# Patient Record
Sex: MALE | Race: WHITE | Employment: FULL TIME | URBAN - METROPOLITAN AREA
[De-identification: names, ages, dates, MRNs, and addresses within clinical notes are randomized per-mention and may not be internally consistent; named-entity substitution may affect disease eponyms.]

---

## 2018-11-20 ENCOUNTER — TELEPHONE (OUTPATIENT)
Dept: VASCULAR SURGERY | Age: 58
End: 2018-11-20

## 2019-02-04 ENCOUNTER — HOSPITAL ENCOUNTER (OUTPATIENT)
Dept: CARDIOLOGY | Age: 59
Discharge: HOME | End: 2019-02-04
Payer: COMMERCIAL

## 2019-02-04 ENCOUNTER — OFFICE VISIT (OUTPATIENT)
Dept: VASCULAR SURGERY | Age: 59
End: 2019-02-04
Payer: COMMERCIAL

## 2019-02-04 DIAGNOSIS — I65.23 ASYMPTOMATIC BILATERAL CAROTID ARTERY STENOSIS: Primary | Chronic | ICD-10-CM

## 2019-02-04 DIAGNOSIS — I65.23 CAROTID ARTERY STENOSIS, ASYMPTOMATIC, BILATERAL: ICD-10-CM

## 2019-02-04 PROCEDURE — G8598 ASA/ANTIPLAT THER USED: HCPCS | Performed by: NURSE PRACTITIONER

## 2019-02-04 PROCEDURE — 4004F PT TOBACCO SCREEN RCVD TLK: CPT | Performed by: NURSE PRACTITIONER

## 2019-02-04 PROCEDURE — G8421 BMI NOT CALCULATED: HCPCS | Performed by: NURSE PRACTITIONER

## 2019-02-04 PROCEDURE — 99213 OFFICE O/P EST LOW 20 MIN: CPT | Performed by: NURSE PRACTITIONER

## 2019-02-04 PROCEDURE — 93880 EXTRACRANIAL BILAT STUDY: CPT

## 2019-02-04 PROCEDURE — G8427 DOCREV CUR MEDS BY ELIG CLIN: HCPCS | Performed by: NURSE PRACTITIONER

## 2019-02-04 PROCEDURE — G8484 FLU IMMUNIZE NO ADMIN: HCPCS | Performed by: NURSE PRACTITIONER

## 2019-02-04 PROCEDURE — 3017F COLORECTAL CA SCREEN DOC REV: CPT | Performed by: NURSE PRACTITIONER

## 2019-08-05 ENCOUNTER — HOSPITAL ENCOUNTER (OUTPATIENT)
Age: 59
Discharge: HOME | End: 2019-08-07

## 2019-08-05 PROCEDURE — 88342 IMHCHEM/IMCYTCHM 1ST ANTB: CPT

## 2019-08-05 PROCEDURE — 88305 TISSUE EXAM BY PATHOLOGIST: CPT

## 2020-03-02 ENCOUNTER — HOSPITAL ENCOUNTER (OUTPATIENT)
Dept: CARDIOLOGY | Age: 60
Discharge: HOME | End: 2020-03-02
Payer: COMMERCIAL

## 2020-03-02 ENCOUNTER — OFFICE VISIT (OUTPATIENT)
Dept: VASCULAR SURGERY | Age: 60
End: 2020-03-02
Payer: COMMERCIAL

## 2020-03-02 VITALS
WEIGHT: 175 LBS | HEART RATE: 76 BPM | SYSTOLIC BLOOD PRESSURE: 110 MMHG | HEIGHT: 68 IN | DIASTOLIC BLOOD PRESSURE: 60 MMHG | BODY MASS INDEX: 26.52 KG/M2 | RESPIRATION RATE: 16 BRPM

## 2020-03-02 PROCEDURE — 3017F COLORECTAL CA SCREEN DOC REV: CPT | Performed by: SURGERY

## 2020-03-02 PROCEDURE — 93880 EXTRACRANIAL BILAT STUDY: CPT

## 2020-03-02 PROCEDURE — 99213 OFFICE O/P EST LOW 20 MIN: CPT | Performed by: PHYSICIAN ASSISTANT

## 2020-03-02 PROCEDURE — G8427 DOCREV CUR MEDS BY ELIG CLIN: HCPCS | Performed by: SURGERY

## 2020-03-02 PROCEDURE — G8419 CALC BMI OUT NRM PARAM NOF/U: HCPCS | Performed by: SURGERY

## 2020-03-02 PROCEDURE — 4004F PT TOBACCO SCREEN RCVD TLK: CPT | Performed by: SURGERY

## 2020-03-02 PROCEDURE — G8484 FLU IMMUNIZE NO ADMIN: HCPCS | Performed by: SURGERY

## 2021-03-01 ENCOUNTER — OFFICE VISIT (OUTPATIENT)
Dept: VASCULAR SURGERY | Age: 61
End: 2021-03-01
Payer: COMMERCIAL

## 2021-03-01 ENCOUNTER — HOSPITAL ENCOUNTER (OUTPATIENT)
Dept: CARDIOLOGY | Age: 61
Discharge: HOME | End: 2021-03-01
Payer: COMMERCIAL

## 2021-03-01 VITALS — WEIGHT: 170 LBS | HEIGHT: 68 IN | BODY MASS INDEX: 25.76 KG/M2

## 2021-03-01 DIAGNOSIS — I65.23 CAROTID ARTERY STENOSIS, ASYMPTOMATIC, BILATERAL: Primary | ICD-10-CM

## 2021-03-01 DIAGNOSIS — I65.23 ASYMPTOMATIC BILATERAL CAROTID ARTERY STENOSIS: ICD-10-CM

## 2021-03-01 PROCEDURE — 4004F PT TOBACCO SCREEN RCVD TLK: CPT | Performed by: NURSE PRACTITIONER

## 2021-03-01 PROCEDURE — 99212 OFFICE O/P EST SF 10 MIN: CPT | Performed by: NURSE PRACTITIONER

## 2021-03-01 PROCEDURE — G8427 DOCREV CUR MEDS BY ELIG CLIN: HCPCS | Performed by: NURSE PRACTITIONER

## 2021-03-01 PROCEDURE — G8484 FLU IMMUNIZE NO ADMIN: HCPCS | Performed by: NURSE PRACTITIONER

## 2021-03-01 PROCEDURE — 93880 EXTRACRANIAL BILAT STUDY: CPT

## 2021-03-01 PROCEDURE — G8419 CALC BMI OUT NRM PARAM NOF/U: HCPCS | Performed by: NURSE PRACTITIONER

## 2021-03-01 PROCEDURE — 3017F COLORECTAL CA SCREEN DOC REV: CPT | Performed by: NURSE PRACTITIONER

## 2021-03-01 RX ORDER — NICOTINE 21 MG/24HR
1 PATCH, TRANSDERMAL 24 HOURS TRANSDERMAL EVERY 24 HOURS
Qty: 30 PATCH | Refills: 3 | Status: SHIPPED
Start: 2021-03-01 | End: 2022-10-11

## 2021-03-01 NOTE — PROGRESS NOTES
Vascular Surgery Outpatient Followup    PCP : Lisy Parekh MD    HISTORY OF PRESENT ILLNESS:    The patient is a 64 y.o. male who is here in regards to follow up of their Asymptomatic Bilateral carotid stenosis. Patient denies hx of stroke, TIA, focal weakness, slurred speech or amaurosis fugax. Patient denies any changes in overall health. He continues to try and quit smoking cigarettes. He states that he is able to stop for a few weeks and then will become stressed and start smoking again. He is currently smoking  0.5- 1 ppd. His dad continues to have medical issues, and the patient is his primary caregiver. His smoking seems to be related to the stress associated with this. Past Medical History:        Diagnosis Date    Carotid artery stenosis      Past Surgical History:        Procedure Laterality Date    HERNIA REPAIR       Current Medications:   Current Outpatient Medications   Medication Sig Dispense Refill    atorvastatin (LIPITOR) 10 MG tablet TAKE 1 TABLET BY MOUTH ONCE DAILY 90 tablet 3    nicotine (NICODERM CQ) 14 MG/24HR Place 1 patch onto the skin every 24 hours 30 patch 3    atorvastatin (LIPITOR) 10 MG tablet TAKE 1 TABLET BY MOUTH EVERY DAILY 30 tablet 0    aspirin 81 MG EC tablet Take 81 mg by mouth daily.  Lutein 20 MG CAPS Take  by mouth.  nicotine (NICODERM CQ) 7 MG/24HR Place 1 patch onto the skin daily 30 patch 0     No current facility-administered medications for this visit. Allergies:  Patient has no known allergies.   Social History     Socioeconomic History    Marital status:      Spouse name: Not on file    Number of children: Not on file    Years of education: Not on file    Highest education level: Not on file   Occupational History    Not on file   Social Needs    Financial resource strain: Not on file    Food insecurity     Worry: Not on file     Inability: Not on file    Transportation needs     Medical: Not on file Non-medical: Not on file   Tobacco Use    Smoking status: Current Every Day Smoker     Packs/day: 0.50    Smokeless tobacco: Never Used   Substance and Sexual Activity    Alcohol use: No    Drug use: Never    Sexual activity: Not on file   Lifestyle    Physical activity     Days per week: Not on file     Minutes per session: Not on file    Stress: Not on file   Relationships    Social connections     Talks on phone: Not on file     Gets together: Not on file     Attends [employer] service: Not on file     Active member of club or organization: Not on file     Attends meetings of clubs or organizations: Not on file     Relationship status: Not on file    Intimate partner violence     Fear of current or ex partner: Not on file     Emotionally abused: Not on file     Physically abused: Not on file     Forced sexual activity: Not on file   Other Topics Concern    Not on file   Social History Narrative    Not on file   Family hx  Denies hx of carotid disease or AAA    Labs  No results found for: WBC, HGB, HCT, PLT, PROTIME, INR, APTT, K, BUN, CREATININE  PHYSICAL EXAM:    CONSTITUTIONAL:  awake, alert, cooperative  CN II - XII not noted  Hearing deficits are not noted  EYES:  lids and lashes normal  ENT: external ears and nose without lesions  NECK:  supple, symmetrical, trachea midline, no carotid bruit  LUNGS:  No increased work of breathing                 Clear to auscultation  CARDIOVASCULAR:  regular rate and rhythm   ABDOMEN:  soft, non-distended, non-tender   Aorta is not palpable  EXTREMITIES:   R UE 5/5 strength, 2+ radial  L UE 5/5 strength, 2+ radial  R LE Edema absent  L LE Edema absent    RADIOLOGY:  Brad Jeff  1960  Date of study: 3/1/21     Indication for study:  Carotid artery stenosis  Study : Bilateral Carotid Artery Duplex Examination     Duplex examination of the RIGHT carotid artery system identifies atherosclerotic plaque.   The peak systolic velocity in internal carotid artery

## 2021-03-01 NOTE — PROGRESS NOTES
[facility] Heart & Vascular Lab - [facility]    This is a pre read worksheet - prior to official physician interpretation    Boystanislaw Schwabtracey  1960  Date of study: 3/1/21    Indication for study:  Carotid artery stenosis  Study : Bilateral Carotid Artery Duplex Examination    Duplex examination of the RIGHT carotid artery system identifies atherosclerotic plaque. The peak systolic velocity in internal carotid artery was 109 centimeters / second. The maximum end diastolic velocity was 30 centimeters / second. The ICA/CCA ratio is 0.9. The right vertebral artery has antegrade flow. Duplex examination of the LEFT carotid artery system identifies atherosclerotic plaque. The peak systolic velocity in internal carotid artery was 128 centimeters / second. The maximum end diastolic velocity was 35 centimeters / second. The ICA/CCA ratio is 0.9. The left vertebral artery has antegrade flow.         LAST STUDY  3/2/2020  Rt 50-59  Lt 50-59

## 2021-04-12 ENCOUNTER — HOSPITAL ENCOUNTER (OUTPATIENT)
Dept: CT IMAGING | Age: 61
Discharge: HOME | End: 2021-04-14
Payer: COMMERCIAL

## 2021-04-12 DIAGNOSIS — R10.9 ABDOMINAL PAIN, UNSPECIFIED ABDOMINAL LOCATION: ICD-10-CM

## 2021-04-12 PROCEDURE — 2580000003 HC RX 258: Performed by: RADIOLOGY

## 2021-04-12 PROCEDURE — 6360000004 HC RX CONTRAST MEDICATION: Performed by: RADIOLOGY

## 2021-04-12 PROCEDURE — 74177 CT ABD & PELVIS W/CONTRAST: CPT

## 2021-04-12 RX ORDER — SODIUM CHLORIDE 0.9 % (FLUSH) 0.9 %
5-40 SYRINGE (ML) INJECTION 2 TIMES DAILY
Status: DISCONTINUED | OUTPATIENT
Start: 2021-04-12 | End: 2021-04-15 | Stop reason: HOSPADM

## 2021-04-12 RX ADMIN — IOHEXOL 50 ML: 240 INJECTION, SOLUTION INTRATHECAL; INTRAVASCULAR; INTRAVENOUS; ORAL at 11:44

## 2021-04-12 RX ADMIN — IOPAMIDOL 100 ML: 755 INJECTION, SOLUTION INTRAVENOUS at 11:44

## 2021-04-12 RX ADMIN — Medication 10 ML: at 11:44

## 2022-03-04 ENCOUNTER — TELEPHONE (OUTPATIENT)
Dept: VASCULAR SURGERY | Age: 62
End: 2022-03-04

## 2022-03-04 NOTE — TELEPHONE ENCOUNTER
Called to confirm appointment for 3/7/22 US at 930 a.m,  Appointment at 10 a.m, left message with date, time, and phone number for patient.

## 2022-03-25 ENCOUNTER — TELEPHONE (OUTPATIENT)
Dept: VASCULAR SURGERY | Age: 62
End: 2022-03-25

## 2022-03-25 NOTE — TELEPHONE ENCOUNTER
Called to confirm appointment for 3/28/22 at 830 a.m, left message with date, time, and phone number for patient.

## 2022-03-28 ENCOUNTER — HOSPITAL ENCOUNTER (OUTPATIENT)
Dept: CARDIOLOGY | Age: 62
Discharge: HOME | End: 2022-03-28
Payer: COMMERCIAL

## 2022-03-28 ENCOUNTER — OFFICE VISIT (OUTPATIENT)
Dept: VASCULAR SURGERY | Age: 62
End: 2022-03-28
Payer: COMMERCIAL

## 2022-03-28 VITALS — WEIGHT: 175 LBS | BODY MASS INDEX: 26.52 KG/M2 | HEIGHT: 68 IN

## 2022-03-28 DIAGNOSIS — I65.23 CAROTID ARTERY STENOSIS, ASYMPTOMATIC, BILATERAL: Primary | ICD-10-CM

## 2022-03-28 DIAGNOSIS — I65.23 CAROTID ARTERY STENOSIS, ASYMPTOMATIC, BILATERAL: ICD-10-CM

## 2022-03-28 PROCEDURE — 4004F PT TOBACCO SCREEN RCVD TLK: CPT | Performed by: SURGERY

## 2022-03-28 PROCEDURE — G8419 CALC BMI OUT NRM PARAM NOF/U: HCPCS | Performed by: SURGERY

## 2022-03-28 PROCEDURE — G8484 FLU IMMUNIZE NO ADMIN: HCPCS | Performed by: SURGERY

## 2022-03-28 PROCEDURE — 99212 OFFICE O/P EST SF 10 MIN: CPT | Performed by: NURSE PRACTITIONER

## 2022-03-28 PROCEDURE — 3017F COLORECTAL CA SCREEN DOC REV: CPT | Performed by: SURGERY

## 2022-03-28 PROCEDURE — 93880 EXTRACRANIAL BILAT STUDY: CPT

## 2022-03-28 PROCEDURE — G8427 DOCREV CUR MEDS BY ELIG CLIN: HCPCS | Performed by: SURGERY

## 2022-03-28 NOTE — PROGRESS NOTES
[facility] Heart & Vascular Lab - [facility]    This is a pre read worksheet - prior to official physician interpretation    Deidre Agrawal  1960  Date of study: 3/28/22    Indication for study:  Carotid artery stenosis  Study : Bilateral Carotid Artery Duplex Examination    Duplex examination of the RIGHT carotid artery system identifies atherosclerotic plaque. The peak systolic velocity in internal carotid artery was 137 centimeters / second. The maximum end diastolic velocity was 35 centimeters / second. The ICA/CCA ratio is 1.1. The right vertebral artery has antegrade flow. Duplex examination of the LEFT carotid artery system identifies atherosclerotic plaque. The peak systolic velocity in internal carotid artery was 106 centimeters / second. The maximum end diastolic velocity was 41 centimeters / second. The ICA/CCA ratio is 0.8. The left vertebral artery has antegrade flow.         LAST STUDY  3/1/2021  Rt 1-49  Lt 1-49

## 2022-03-28 NOTE — PROGRESS NOTES
Vascular Surgery Outpatient Followup    PCP : Gisella Alvarez MD    HISTORY OF PRESENT ILLNESS:    The patient is a 63 y.o. male who is here in regards to follow up of their Asymptomatic Bilateral carotid stenosis. Patient denies hx of stroke, TIA, focal weakness, slurred speech or amaurosis fugax. The patient had COVID in January and hasn't felt like himself since. He has been experiencing tachycardia and he is going to have a stress test in the near future. He denies chest pain or shortness of breath. He continues to try and quit smoking cigarettes. He states that he is able to stop for a few weeks and then will become stressed and start smoking again. He has only had 2 cigarettes in the past 2 weeks. His dad continues to have medical issues, and the patient is his primary caregiver. His smoking seems to be related to the stress associated with this. Past Medical History:        Diagnosis Date    Carotid artery stenosis      Past Surgical History:        Procedure Laterality Date    CATARACT REMOVAL      HERNIA REPAIR       Current Medications:   Current Outpatient Medications   Medication Sig Dispense Refill    atorvastatin (LIPITOR) 10 MG tablet TAKE 1 TABLET BY MOUTH ONCE DAILY 90 tablet 3    nicotine (NICODERM CQ) 14 MG/24HR Place 1 patch onto the skin every 24 hours 30 patch 3    atorvastatin (LIPITOR) 10 MG tablet TAKE 1 TABLET BY MOUTH EVERY DAILY 30 tablet 0    aspirin 81 MG EC tablet Take 81 mg by mouth daily.  Lutein 20 MG CAPS Take  by mouth.  nicotine (NICODERM CQ) 21 MG/24HR Place 1 patch onto the skin every 24 hours 30 patch 3    nicotine (NICODERM CQ) 7 MG/24HR Place 1 patch onto the skin daily 30 patch 0     No current facility-administered medications for this visit. Allergies:  Patient has no known allergies.   Social History     Socioeconomic History    Marital status:      Spouse name: Not on file    Number of children: Not on file    Years of education: Not on file    Highest education level: Not on file   Occupational History    Not on file   Tobacco Use    Smoking status: Current Every Day Smoker     Packs/day: 0.50    Smokeless tobacco: Never Used   Vaping Use    Vaping Use: Never used   Substance and Sexual Activity    Alcohol use: No    Drug use: Never    Sexual activity: Not on file   Other Topics Concern    Not on file   Social History Narrative    Not on file     Social Determinants of Health     Financial Resource Strain:     Difficulty of Paying Living Expenses: Not on file   Food Insecurity:     Worried About Running Out of Food in the Last Year: Not on file    Maria Luz of Food in the Last Year: Not on file   Transportation Needs:     Lack of Transportation (Medical): Not on file    Lack of Transportation (Non-Medical):  Not on file   Physical Activity:     Days of Exercise per Week: Not on file    Minutes of Exercise per Session: Not on file   Stress:     Feeling of Stress : Not on file   Social Connections:     Frequency of Communication with Friends and Family: Not on file    Frequency of Social Gatherings with Friends and Family: Not on file    Attends Adventism Services: Not on file    Active Member of [address] [place] [address] or Organizations: Not on file    Attends Club or Organization Meetings: Not on file    Marital Status: Not on file   Intimate Partner Violence:     Fear of Current or Ex-Partner: Not on file    Emotionally Abused: Not on file    Physically Abused: Not on file    Sexually Abused: Not on file   Housing Stability:     Unable to Pay for Housing in the Last Year: Not on file    Number of [place] in the Last Year: Not on file    Unstable Housing in the Last Year: Not on file   Family hx  Denies hx of carotid disease or AAA    Labs  No results found for: WBC, HGB, HCT, PLT, PROTIME, INR, APTT, K, BUN, CREATININE  PHYSICAL EXAM:    CONSTITUTIONAL:  awake, alert, cooperative  CN II - XII not noted  Hearing deficits are not noted  EYES:  lids and lashes normal  ENT: external ears and nose without lesions  NECK:  supple, symmetrical, trachea midline, no carotid bruit  LUNGS:  No increased work of breathing                 Clear to auscultation  CARDIOVASCULAR:  regular rate and rhythm   ABDOMEN:  soft, non-distended, non-tender   Aorta is not palpable  EXTREMITIES:   R UE 5/5 strength, 2+ radial  L UE 5/5 strength, 2+ radial  R LE Edema absent  L LE Edema absent    RADIOLOGY:  Elliott Lagos  1960  Date of study: 3/28/22     Indication for study:  Carotid artery stenosis  Study : Bilateral Carotid Artery Duplex Examination     Duplex examination of the RIGHT carotid artery system identifies atherosclerotic plaque. The peak systolic velocity in internal carotid artery was 137 centimeters / second. The maximum end diastolic velocity was 35 centimeters / second. The ICA/CCA ratio is 1.1. The right vertebral artery has antegrade flow.     Duplex examination of the LEFT carotid artery system identifies atherosclerotic plaque. The peak systolic velocity in internal carotid artery was 106 centimeters / second. The maximum end diastolic velocity was 41 centimeters / second. The ICA/CCA ratio is 0.8.   The left vertebral artery has antegrade flow.      LAST STUDY  3/1/2021  Rt 1-49  Lt 1-49     A/P Asymptomatic Bilateral Carotid Stenosis  · Remains assx  · US from today reveals:  · R ICA 1-49% stenosis, stable as compared to previous  · L ICA 1-49% stenosis, stable as compared to previous   · Continue medical management with asa, statin  · Emphasized importance of Tobacco cessation  · Pt understands increased risk of carotid disease progression associated with tobacco use  · No indication for surgical intervention at this time  · Discussed with patient pathophysiology of carotid stenosis and all ?s answered  · Discussed with patient symptoms of stroke, TIA and they understood to go to ER immediately if any symptoms developed  · F/U as outpatient in 12 Months with repeat carotid duplex    Pt seen and plan reviewed with Dr. Patito Eaton.      Bernie Abdi, APRN - CNP

## 2022-04-06 ENCOUNTER — TELEPHONE (OUTPATIENT)
Dept: CARDIOLOGY | Age: 62
End: 2022-04-06

## 2022-04-20 ENCOUNTER — TELEPHONE (OUTPATIENT)
Dept: CARDIOLOGY | Age: 62
End: 2022-04-20

## 2022-04-20 NOTE — TELEPHONE ENCOUNTER
Left message to confirm stress for Friday 4/22/22. Instructions given included: nothing to eat/drink after midnight, hold all forms of caffeine for 12 hours prior to test, no medication holds. Advised to call with any questions.

## 2022-04-22 ENCOUNTER — HOSPITAL ENCOUNTER (OUTPATIENT)
Dept: CARDIOLOGY | Age: 62
Discharge: HOME | End: 2022-04-22
Payer: COMMERCIAL

## 2022-04-22 VITALS
HEIGHT: 68 IN | RESPIRATION RATE: 12 BRPM | SYSTOLIC BLOOD PRESSURE: 100 MMHG | DIASTOLIC BLOOD PRESSURE: 70 MMHG | HEART RATE: 68 BPM | WEIGHT: 175 LBS | BODY MASS INDEX: 26.52 KG/M2

## 2022-04-22 DIAGNOSIS — R07.9 CHEST PAIN, UNSPECIFIED TYPE: Primary | ICD-10-CM

## 2022-04-22 LAB
LV EF: 70 %
LVEF MODALITY: NORMAL

## 2022-04-22 PROCEDURE — 93017 CV STRESS TEST TRACING ONLY: CPT

## 2022-04-22 PROCEDURE — A9500 TC99M SESTAMIBI: HCPCS | Performed by: INTERNAL MEDICINE

## 2022-04-22 PROCEDURE — 78452 HT MUSCLE IMAGE SPECT MULT: CPT

## 2022-04-22 PROCEDURE — 3430000000 HC RX DIAGNOSTIC RADIOPHARMACEUTICAL: Performed by: INTERNAL MEDICINE

## 2022-04-22 PROCEDURE — 2580000003 HC RX 258: Performed by: INTERNAL MEDICINE

## 2022-04-22 RX ORDER — SODIUM CHLORIDE 0.9 % (FLUSH) 0.9 %
10 SYRINGE (ML) INJECTION PRN
Status: DISCONTINUED | OUTPATIENT
Start: 2022-04-22 | End: 2022-04-23 | Stop reason: HOSPADM

## 2022-04-22 RX ORDER — ASCORBIC ACID 500 MG
500 TABLET ORAL DAILY
COMMUNITY
End: 2022-10-11

## 2022-04-22 RX ORDER — PANTOPRAZOLE SODIUM 40 MG/1
1 TABLET, DELAYED RELEASE ORAL EVERY OTHER DAY
COMMUNITY
Start: 2022-03-27

## 2022-04-22 RX ADMIN — Medication 32.9 MILLICURIE: at 08:36

## 2022-04-22 RX ADMIN — Medication 9.9 MILLICURIE: at 07:13

## 2022-04-22 RX ADMIN — SODIUM CHLORIDE, PRESERVATIVE FREE 10 ML: 5 INJECTION INTRAVENOUS at 07:14

## 2022-04-22 RX ADMIN — SODIUM CHLORIDE, PRESERVATIVE FREE 10 ML: 5 INJECTION INTRAVENOUS at 08:37

## 2022-04-22 NOTE — PROCEDURES
[address] [facility] [address]  [phone]                Exercise Stress Nuclear Gated SPECT Study    Name: [facility] Account Number: [de-identified]    :  1960      Sex: male              Date of Study:  2022    Height: 5' 8\" (172.7 cm)  Weight: 175 lb (79.4 kg)     Ordering Provider: Asiya Boss MD         PCP: Asiya Boss MD      Cardiologist: None                        Interpreting Physician: Aditya Durant DO  _________________________________________________________________________________    Indication:   Detecting the presence and location of coronary artery disease    Clinical History:   Patient has no known history of coronary artery disease. Resting ECG:    Sinus rhythm, 66 bpm.  Normal axis. Exercise: The patient exercised using a Alex protocol, completing 9:00 minutes and reaching an estimated work load of 58.3 metabolic equivalents (METS). Resting HR was 66. Peak exercise heart rate was 146 ( 91% of maximum predicted heart rate for age). Baseline /70. Peak exercise /64. The blood pressure response to exercise was normal      Exercise was terminated due to dyspnea. The patient experienced no chest pain with exercise. Exercise ECG:   The patient demonstrated occasional PAC's during exercise. With exercise, there were no ST segment changes of significance at the heart rate achieved. Vidal treadmill score was 9 implying low risk. IMAGING: Myocardial perfusion imaging was performed at rest 30-35 minutes following the intravenous injection of 9.9 mCi of (Tc-Sestamibi) followed by 10 ml of Normal Saline. At peak exercise, the patient was injected intravenously with 32.9 mCi of (Tc-Sestamibi) followed by 10 ml of Normal Saline. Gated post-stress tomographic imaging was performed 20-25 minutes after stress.      FINDINGS: The overall quality of the study was good. Left ventricular cavity size was noted to be normal.    Rotational analog analysis demonstrated soft tissue diaphragmatic attenuation. The gated SPECT stress imaging in the short, vertical long, and horizontal long axis demonstrated     A moderate defect was present in the apex wall(s) that was  small sized by quantification. There also was a mild defect present in the basal inferior wall(s) that was  small sized by quantification. The resting images show no change. Gated SPECT left ventricular ejection fraction was calculated to be 70%, with normal myocardial thickening and wall motion. Impression:    1. Exercise EKG was  negative. 2. The patient experienced no chest pain with exercise. 3. The myocardial perfusion imaging was normal with attenuation artifact. 4. Overall left ventricular systolic function was normal without regional wall motion abnormalities. 5. Vidal treadmill score was 9 implying low risk. 6. Exercise capacity was average. 7. Low risk general exercise treadmill test.    Thank you for sending your patient to this [facility].      Electronically signed by Lupillo Alcaraz DO on 4/22/22 at 12:35 PM EDT

## 2022-05-23 ENCOUNTER — HOSPITAL ENCOUNTER (OUTPATIENT)
Age: 62
Discharge: HOME | End: 2022-05-25

## 2022-05-23 PROCEDURE — 88342 IMHCHEM/IMCYTCHM 1ST ANTB: CPT

## 2022-05-23 PROCEDURE — 88305 TISSUE EXAM BY PATHOLOGIST: CPT

## 2022-10-11 ENCOUNTER — OFFICE VISIT (OUTPATIENT)
Dept: PODIATRY | Age: 62
End: 2022-10-11
Payer: COMMERCIAL

## 2022-10-11 VITALS — BODY MASS INDEX: 25.76 KG/M2 | WEIGHT: 170 LBS | HEIGHT: 68 IN

## 2022-10-11 DIAGNOSIS — M79.671 RIGHT FOOT PAIN: Primary | ICD-10-CM

## 2022-10-11 DIAGNOSIS — B35.1 TINEA UNGUIUM: ICD-10-CM

## 2022-10-11 DIAGNOSIS — M72.2 PLANTAR FASCIAL FIBROMATOSIS: ICD-10-CM

## 2022-10-11 PROCEDURE — 3017F COLORECTAL CA SCREEN DOC REV: CPT | Performed by: PODIATRIST

## 2022-10-11 PROCEDURE — G8427 DOCREV CUR MEDS BY ELIG CLIN: HCPCS | Performed by: PODIATRIST

## 2022-10-11 PROCEDURE — G8484 FLU IMMUNIZE NO ADMIN: HCPCS | Performed by: PODIATRIST

## 2022-10-11 PROCEDURE — 99203 OFFICE O/P NEW LOW 30 MIN: CPT | Performed by: PODIATRIST

## 2022-10-11 PROCEDURE — 4004F PT TOBACCO SCREEN RCVD TLK: CPT | Performed by: PODIATRIST

## 2022-10-11 PROCEDURE — G8419 CALC BMI OUT NRM PARAM NOF/U: HCPCS | Performed by: PODIATRIST

## 2022-10-11 RX ORDER — METOPROLOL SUCCINATE 25 MG/1
TABLET, EXTENDED RELEASE ORAL
COMMUNITY
Start: 2022-10-04

## 2022-10-11 RX ORDER — MELOXICAM 15 MG/1
15 TABLET ORAL DAILY
Qty: 30 TABLET | Refills: 1 | Status: SHIPPED | OUTPATIENT
Start: 2022-10-11 | End: 2022-11-10

## 2022-10-11 NOTE — PROGRESS NOTES
New patient in office with c/o right heel pain. Sx x several months. Denies any injury. Does stand all day at work. Wears inserts in his work boots. Xrays obtained prior to apt. Also c/o nail fungus. New referral for plantar fasciitis right. 10/11/22  Kain Cruz : 1960 Sex: male  Age: 63 y.o. Patient was referred by Marcy Pete MD    CC:    Pain right heel  History nail fungus    HPI:   This pleasant 82-year-old male patient referred to me today right heel pain. History of nail fungus. No recent formal treatment or therapy. Does work on his feet. Symptoms present several months right heel. Pain worse at the end of the day. No current anti-inflammatories. No current custom over-the-counter inserts. Radiographs obtained today. No additional pedal complaints at this time. ROS:  Const: Denies constitutional symptoms  Musculo: Denies symptoms other than stated above  Skin: Denies symptoms other than stated above       Current Outpatient Medications:     meloxicam (MOBIC) 15 MG tablet, Take 1 tablet by mouth daily for 30 doses, Disp: 30 tablet, Rfl: 1    ciclopirox (PENLAC) 8 % solution, Apply once daily all toenails. , Disp: 6 mL, Rfl: 2    metoprolol succinate (TOPROL XL) 25 MG extended release tablet, TAKE 1 TABLET BY MOUTH EVERY DAY, Disp: , Rfl:     pantoprazole (PROTONIX) 40 MG tablet, 1 tablet every other day, Disp: , Rfl:     atorvastatin (LIPITOR) 10 MG tablet, TAKE 1 TABLET BY MOUTH EVERY DAILY, Disp: 30 tablet, Rfl: 0    aspirin 81 MG EC tablet, Take 81 mg by mouth daily. , Disp: , Rfl:   No Known Allergies    Past Medical History:   Diagnosis Date    Carotid artery stenosis            Vitals:    10/11/22 0816   Weight: 170 lb (77.1 kg)   Height: 5' 8\" (1.727 m)       Work History/Social History:    Foot and ankle history:     Focused Lower Extremity Physical Exam:    Neurovascular examination:    Dorsalis Pedis palpable bilateral.  Posterior tibialis palpable bilateral. Capillary Refill Time:  Immediate return  Hair growth:  Symmetrical and bilateral   Skin:  Not atrophic  Edema: No edema bilateral feet or ankles. Neurologic:  Light touch intact bilateral.      Musculoskeletal/ Orthopedic examination:    Equinis: Absent bilateral  Dorsiflexion, plantarflexion, inversion, eversion bilateral 5 out of 5 muscle strength  Wiggling toes  Negative Homans  Mild tenderness medial and central band right plantar fascia. Negative calcaneal squeeze test.    Dermatology examination:    Mycotic thickened dystrophic left and right great toe  Left and right third toenail  No open wounds    Assessment and Plan:  [facility] was seen today for foot pain. Diagnoses and all orders for this visit:    Right foot pain  -     XR FOOT RIGHT (MIN 3 VIEWS); Future    Tinea unguium    Plantar fascial fibromatosis    Other orders  -     meloxicam (MOBIC) 15 MG tablet; Take 1 tablet by mouth daily for 30 doses  -     ciclopirox (PENLAC) 8 % solution; Apply once daily all toenails. Clinically also presents as tinea unguium    Plantar fasciitis is a condition involving inflammation and pain to the heel. I did recommend passive and active range of motion stretches and strengthening of both the Achilles tendon and plantar fascia  Ice 10 minutes each night on the bottom of the plantar fascia  Avoid barefoot  Continue supportive walking shoe with well supported insert  I did recommend Powerstep Original full-length over-the-counter orthotics. Mobic 15 mg take once daily as directed. Risk and benefits of anti-inflammatories discussed in detail. Penlac 8% topically once daily bilateral great toenails. Proper application discussed. Use of alcohol removing agent once weekly. He does have an upcoming appointment with his primary care physician and likely will have blood work. We did discuss oral Lamisil. Will need blood work with hepatic function testing prior to beginning him on oral Lamisil.   I will follow-up by 6 weeks to monitor progression follow-up on right plantar fasciitis. If continued symptoms would consider cortisone injection. Return in about 6 weeks (around 11/22/2022). Seen By:  Paul Low DPM      Document was created using voice recognition software. Note was reviewed, however may contain grammatical errors.

## 2022-12-08 ENCOUNTER — PROCEDURE VISIT (OUTPATIENT)
Dept: PODIATRY | Age: 62
End: 2022-12-08

## 2022-12-08 DIAGNOSIS — M79.675 PAIN OF LEFT GREAT TOE: ICD-10-CM

## 2022-12-08 DIAGNOSIS — M72.2 PLANTAR FASCIAL FIBROMATOSIS: Primary | ICD-10-CM

## 2022-12-08 DIAGNOSIS — M79.674 PAIN OF RIGHT GREAT TOE: ICD-10-CM

## 2022-12-08 DIAGNOSIS — B35.1 TINEA UNGUIUM: ICD-10-CM

## 2022-12-08 DIAGNOSIS — L60.0 INGROWN NAIL: ICD-10-CM

## 2022-12-08 RX ORDER — DOXYCYCLINE HYCLATE 100 MG/1
100 CAPSULE ORAL 2 TIMES DAILY
Qty: 14 CAPSULE | Refills: 0 | Status: SHIPPED | OUTPATIENT
Start: 2022-12-08 | End: 2022-12-15

## 2022-12-08 NOTE — PROGRESS NOTES
Patient in office to follow up with nail fungus and foot pain. Pain has improved since purchasing otc orthotics. CC:    Painful left and right great toenail  Follow-up heel pain    HPI:   Presents today pain both great toenails. Here today for follow-up heel pain. No significant heel pain today. Very pleased with progression. Tolerating power step orthotics. Well. Would like to discuss total nail avulsion both great toenails as he does have thickened toenails which do bother him. ROS:  Const: Denies constitutional symptoms  Musculo: Denies symptoms other than stated above  Skin: Denies symptoms other than stated above       Current Outpatient Medications:     doxycycline hyclate (VIBRAMYCIN) 100 MG capsule, Take 1 capsule by mouth 2 times daily for 7 days, Disp: 14 capsule, Rfl: 0    metoprolol succinate (TOPROL XL) 25 MG extended release tablet, TAKE 1 TABLET BY MOUTH EVERY DAY, Disp: , Rfl:     meloxicam (MOBIC) 15 MG tablet, Take 1 tablet by mouth daily for 30 doses, Disp: 30 tablet, Rfl: 1    ciclopirox (PENLAC) 8 % solution, Apply once daily all toenails. , Disp: 6 mL, Rfl: 2    pantoprazole (PROTONIX) 40 MG tablet, 1 tablet every other day, Disp: , Rfl:     atorvastatin (LIPITOR) 10 MG tablet, TAKE 1 TABLET BY MOUTH EVERY DAILY, Disp: 30 tablet, Rfl: 0    aspirin 81 MG EC tablet, Take 81 mg by mouth daily. , Disp: , Rfl:   No Known Allergies    Past Medical History:   Diagnosis Date    Carotid artery stenosis            There were no vitals filed for this visit. Work History/Social History: Foot and ankle history:     Focused Lower Extremity Physical Exam:    Neurovascular examination:    Dorsalis Pedis palpable bilateral.  Posterior tibialis palpable bilateral.    Capillary Refill Time:  Immediate return  Hair growth:  Symmetrical and bilateral   Skin:  Not atrophic  Edema: No edema bilateral feet or ankles.   Neurologic:  Light touch intact bilateral.      Musculoskeletal/ Orthopedic examination:    Equinis: Absent bilateral  Dorsiflexion, plantarflexion, inversion, eversion bilateral 5 out of 5 muscle strength  Wiggling toes  Negative Homans  No significant pain plantar fascial bilateral.  Negative calcaneal squeeze test.  Negative Basilio. There is tenderness entire left and right great toenail today. Dermatology examination:    Significant mycotic, dystrophic subungual debris bilateral great toenail. Incurvated medial lateral borders and proximal borders. Assessment and Plan:  Smita Chisholm was seen today for callouses and nail problem. Diagnoses and all orders for this visit:    Plantar fascial fibromatosis    Tinea unguium    Ingrown nail    Pain of left great toe    Pain of right great toe    Other orders  -     doxycycline hyclate (VIBRAMYCIN) 100 MG capsule; Take 1 capsule by mouth 2 times daily for 7 days      Follow-up painful bilateral great toenail. History tinea unguium. Tolerating parsnip inserts well no significant heel pain today. Avoid barefoot. After verbal consent for nail avulsion, alcohol prep was used ethyl chloride used over injection site with 6cc 1% lidocaine plain injected dorsal approach medial and lateral left great toe in standard fashion. Betadine prep was used over the left great toe, tourniquet was applied. Sterile instrumentation was used with a freer to free the entire border of the ingrown portion of the toenail. Hemostat was used to remove the ingrown portion of the toenail and passed to the back table. Phenol applied in 3 separate 30 second intervals in standard fashion at the base of the ingrown toenail. Alcohol was used to wash the entire digit. Patient tolerated the procedure well. Dressing was applied consisting of bacitracin, 4x4 gazue, Coban. Tourniquet was released.     After verbal consent for nail avulsion, alcohol prep was used ethyl chloride used over injection site with 6cc 1% lidocaine plain injected dorsal approach medial and lateral right great toe in standard fashion. Betadine prep was used over the right great toe, tourniquet was applied. Sterile instrumentation was used with a freer to free the entire border of the ingrown portion of the toenail. Hemostat was used to remove the ingrown portion of the toenail and passed to the back table. Phenol applied in 3 separate 30 second intervals in standard fashion at the base of the ingrown toenail. Alcohol was used to wash the entire digit. Patient tolerated the procedure well. Dressing was applied consisting of bacitracin, 4x4 gazue, Coban. Tourniquet was released. Postoperative instructions were discussed in detail. Keep dressing clean dry and intact until tonight. Remove bandage and apply bacitracin with band-aid each day for 1 week. After 1 week apply band-aid and continue to keep clean dry and intact. Avoid excess soaking and avoid pool or lake water. Limited activity over the next 24-48 hours. Doxycycline 100 mg twice daily 1 week. Follow-up 2 weeks. Call sooner with any questions of concerns or any signs on increase drainage or redness. Return in about 2 weeks (around 12/22/2022). Seen By:  Paul Low DPM      Document was created using voice recognition software. Note was reviewed, however may contain grammatical errors.

## 2022-12-20 ENCOUNTER — OFFICE VISIT (OUTPATIENT)
Dept: PODIATRY | Age: 62
End: 2022-12-20
Payer: COMMERCIAL

## 2022-12-20 VITALS — HEIGHT: 68 IN | BODY MASS INDEX: 25.76 KG/M2 | WEIGHT: 170 LBS

## 2022-12-20 DIAGNOSIS — L60.0 INGROWN NAIL: ICD-10-CM

## 2022-12-20 DIAGNOSIS — L23.9 ALLERGIC DERMATITIS: Primary | ICD-10-CM

## 2022-12-20 DIAGNOSIS — B35.3 TINEA PEDIS OF BOTH FEET: ICD-10-CM

## 2022-12-20 PROCEDURE — G8419 CALC BMI OUT NRM PARAM NOF/U: HCPCS | Performed by: PODIATRIST

## 2022-12-20 PROCEDURE — 3017F COLORECTAL CA SCREEN DOC REV: CPT | Performed by: PODIATRIST

## 2022-12-20 PROCEDURE — G8427 DOCREV CUR MEDS BY ELIG CLIN: HCPCS | Performed by: PODIATRIST

## 2022-12-20 PROCEDURE — G8484 FLU IMMUNIZE NO ADMIN: HCPCS | Performed by: PODIATRIST

## 2022-12-20 PROCEDURE — 99213 OFFICE O/P EST LOW 20 MIN: CPT | Performed by: PODIATRIST

## 2022-12-20 PROCEDURE — 4004F PT TOBACCO SCREEN RCVD TLK: CPT | Performed by: PODIATRIST

## 2022-12-20 RX ORDER — MOMETASONE FUROATE 1 MG/G
CREAM TOPICAL
Qty: 50 G | Refills: 1 | Status: SHIPPED | OUTPATIENT
Start: 2022-12-20

## 2022-12-20 RX ORDER — PRENATAL VIT 91/IRON/FOLIC/DHA 28-975-200
COMBINATION PACKAGE (EA) ORAL
Qty: 42 G | Refills: 1 | Status: SHIPPED | OUTPATIENT
Start: 2022-12-20

## 2022-12-20 NOTE — PROGRESS NOTES
Patient in office to follow up bilat nail avulsion. States he had a reaction to the adhesive from the bandage. Last Friday and Saturday his skin became raw and started to peel. CC:    2-week follow-up bilateral great toenail total nail avulsion  Presents allergic contact dermatitis likely to Band-Aid    HPI:   Radha Atkins presents 2-week follow-up total nail avulsion left and right. States he did forget but he does have a history of reaction to Band-Aids and does have some itching around the left and right great toe due to the Band-Aid. Denies any pain at his great toes today. Denies nausea vomiting fever chills shortness of breath. Did complete oral antibiotic. Has been wearing just paper tape and regular socks. ROS:  Const: Denies constitutional symptoms  Musculo: Denies symptoms other than stated above  Skin: Denies symptoms other than stated above       Current Outpatient Medications:     mometasone (ELOCON) 0.1 % cream, Apply topically daily. , Disp: 50 g, Rfl: 1    terbinafine (LAMISIL) 1 % cream, Apply affected area topically 2 times daily. , Disp: 42 g, Rfl: 1    metoprolol succinate (TOPROL XL) 25 MG extended release tablet, TAKE 1 TABLET BY MOUTH EVERY DAY, Disp: , Rfl:     meloxicam (MOBIC) 15 MG tablet, Take 1 tablet by mouth daily for 30 doses, Disp: 30 tablet, Rfl: 1    ciclopirox (PENLAC) 8 % solution, Apply once daily all toenails. , Disp: 6 mL, Rfl: 2    pantoprazole (PROTONIX) 40 MG tablet, 1 tablet every other day, Disp: , Rfl:     atorvastatin (LIPITOR) 10 MG tablet, TAKE 1 TABLET BY MOUTH EVERY DAILY, Disp: 30 tablet, Rfl: 0    aspirin 81 MG EC tablet, Take 81 mg by mouth daily. , Disp: , Rfl:   Allergies   Allergen Reactions    Adhesive Tape        Past Medical History:   Diagnosis Date    Carotid artery stenosis            Vitals:    12/20/22 0809   Weight: 170 lb (77.1 kg)   Height: 5' 8\" (1.727 m)         Work History/Social History:    Foot and ankle history:     Focused Lower Extremity Physical Exam:    Neurovascular examination:    Dorsalis Pedis palpable bilateral.  Posterior tibialis palpable bilateral.    Capillary Refill Time:  Immediate return  Hair growth:  Symmetrical and bilateral   Skin:  Not atrophic  Edema: No edema bilateral feet or ankles. Neurologic:  Light touch intact bilateral.      Musculoskeletal/ Orthopedic examination:    Equinis: Absent bilateral  Dorsiflexion, plantarflexion, inversion, eversion bilateral 5 out of 5 muscle strength  Wiggling toes  Negative Homans  No pain bilateral great toe. Dermatology examination:    Skin well coapted bilateral great toe toenail avulsion site. There is dried moccasin and erythematous skin around the nail bed left and right great toe consistent with likely contact dermatitis. Assessment and Plan:  Oswaldo Mcbride was seen today for follow-up and ingrown toenail. Diagnoses and all orders for this visit:    Allergic dermatitis    Ingrown nail    Tinea pedis of both feet    Other orders  -     mometasone (ELOCON) 0.1 % cream; Apply topically daily. -     terbinafine (LAMISIL) 1 % cream; Apply affected area topically 2 times daily. 2-week follow-up bilateral great toenail total nail avulsion  Presents allergic contact dermatitis likely to Band-Aid. I did recommend Elocon 0.1% cream and Lamisil 1% cream twice daily around both great toes. I did not recommend using a Band-Aid as he does present clinically as allergic contact dermatitis to the Band-Aids. I did recommend using dry clean socks daily. I will follow-up 1 week. Return in about 1 week (around 12/27/2022). Seen By:  Chadd Aguiar DPM      Document was created using voice recognition software. Note was reviewed, however may contain grammatical errors.

## 2022-12-27 ENCOUNTER — OFFICE VISIT (OUTPATIENT)
Dept: PODIATRY | Age: 62
End: 2022-12-27
Payer: COMMERCIAL

## 2022-12-27 DIAGNOSIS — L60.0 INGROWN NAIL: ICD-10-CM

## 2022-12-27 DIAGNOSIS — B35.3 TINEA PEDIS OF BOTH FEET: Primary | ICD-10-CM

## 2022-12-27 DIAGNOSIS — L23.9 ALLERGIC DERMATITIS: ICD-10-CM

## 2022-12-27 PROCEDURE — G8419 CALC BMI OUT NRM PARAM NOF/U: HCPCS | Performed by: PODIATRIST

## 2022-12-27 PROCEDURE — 4004F PT TOBACCO SCREEN RCVD TLK: CPT | Performed by: PODIATRIST

## 2022-12-27 PROCEDURE — 99213 OFFICE O/P EST LOW 20 MIN: CPT | Performed by: PODIATRIST

## 2022-12-27 PROCEDURE — G8428 CUR MEDS NOT DOCUMENT: HCPCS | Performed by: PODIATRIST

## 2022-12-27 PROCEDURE — G8484 FLU IMMUNIZE NO ADMIN: HCPCS | Performed by: PODIATRIST

## 2022-12-27 PROCEDURE — 3017F COLORECTAL CA SCREEN DOC REV: CPT | Performed by: PODIATRIST

## 2022-12-27 NOTE — PROGRESS NOTES
Patient in office to follow up with rash bilat great toes. CC:    Follow-up total nail avulsion great toenail bilateral  Follow-up contact dermatitis    HPI:   Follow-up total nail avulsion great toenail bilateral  Follow-up contact dermatitis  No pain foot or ankle. Has been using topical Lamisil and Elocon. Noticed improvement. Has been wearing regular socks. Denies any open wounds. No additional pedal complaints. ROS:  Const: Denies constitutional symptoms  Musculo: Denies symptoms other than stated above  Skin: Denies symptoms other than stated above       Current Outpatient Medications:     mometasone (ELOCON) 0.1 % cream, Apply topically daily. , Disp: 50 g, Rfl: 1    terbinafine (LAMISIL) 1 % cream, Apply affected area topically 2 times daily. , Disp: 42 g, Rfl: 1    metoprolol succinate (TOPROL XL) 25 MG extended release tablet, TAKE 1 TABLET BY MOUTH EVERY DAY, Disp: , Rfl:     meloxicam (MOBIC) 15 MG tablet, Take 1 tablet by mouth daily for 30 doses, Disp: 30 tablet, Rfl: 1    ciclopirox (PENLAC) 8 % solution, Apply once daily all toenails. , Disp: 6 mL, Rfl: 2    pantoprazole (PROTONIX) 40 MG tablet, 1 tablet every other day, Disp: , Rfl:     atorvastatin (LIPITOR) 10 MG tablet, TAKE 1 TABLET BY MOUTH EVERY DAILY, Disp: 30 tablet, Rfl: 0    aspirin 81 MG EC tablet, Take 81 mg by mouth daily. , Disp: , Rfl:   Allergies   Allergen Reactions    Adhesive Tape        Past Medical History:   Diagnosis Date    Carotid artery stenosis            There were no vitals filed for this visit. Work History/Social History: Foot and ankle history:     Focused Lower Extremity Physical Exam:    Neurovascular examination:    Dorsalis Pedis palpable bilateral.  Posterior tibialis palpable bilateral.    Capillary Refill Time:  Immediate return  Hair growth:  Symmetrical and bilateral   Skin:  Not atrophic  Edema: No edema bilateral feet or ankles.   Neurologic:  Light touch intact bilateral. Musculoskeletal/ Orthopedic examination:    Equinis: Absent bilateral  Dorsiflexion, plantarflexion, inversion, eversion bilateral 5 out of 5 muscle strength  Wiggling toes  Negative Homans  No pain bilateral foot or ankle today. No pain overlying great toe. Dermatology examination:    Skin well coapted total nail avulsion sites. Improving moccasin distribution dorsal great toe bilateral.  No open or draining wounds. Assessment and Plan:  Tiffanie Abdalla was seen today for follow-up. Diagnoses and all orders for this visit:    Tinea pedis of both feet    Ingrown nail    Allergic dermatitis      Follow-up bilateral total nail avulsions left and right. Clinically did present contact dermatitis from Band-Aid at last visit. I did still recommend Elocon 0.1% and Lamisil 1% twice daily on top of both great toes. Progressing very well. No use of any bandage at this time. Skin is well-healed where total nail avulsion sites were performed. I will follow-up in 3 weeks to monitor progression. Return in about 3 weeks (around 1/17/2023). Seen By:  Benjamin Johansen DPM      Document was created using voice recognition software. Note was reviewed, however may contain grammatical errors.

## 2023-01-17 ENCOUNTER — OFFICE VISIT (OUTPATIENT)
Dept: PODIATRY | Age: 63
End: 2023-01-17
Payer: COMMERCIAL

## 2023-01-17 VITALS — WEIGHT: 170 LBS | BODY MASS INDEX: 25.76 KG/M2 | HEIGHT: 68 IN

## 2023-01-17 DIAGNOSIS — L23.9 ALLERGIC DERMATITIS: ICD-10-CM

## 2023-01-17 DIAGNOSIS — B35.3 TINEA PEDIS OF BOTH FEET: Primary | ICD-10-CM

## 2023-01-17 DIAGNOSIS — L60.0 INGROWN NAIL: ICD-10-CM

## 2023-01-17 PROCEDURE — G8484 FLU IMMUNIZE NO ADMIN: HCPCS | Performed by: PODIATRIST

## 2023-01-17 PROCEDURE — 99213 OFFICE O/P EST LOW 20 MIN: CPT | Performed by: PODIATRIST

## 2023-01-17 PROCEDURE — G8427 DOCREV CUR MEDS BY ELIG CLIN: HCPCS | Performed by: PODIATRIST

## 2023-01-17 PROCEDURE — 3017F COLORECTAL CA SCREEN DOC REV: CPT | Performed by: PODIATRIST

## 2023-01-17 PROCEDURE — G8419 CALC BMI OUT NRM PARAM NOF/U: HCPCS | Performed by: PODIATRIST

## 2023-01-17 PROCEDURE — 4004F PT TOBACCO SCREEN RCVD TLK: CPT | Performed by: PODIATRIST

## 2023-01-17 NOTE — PROGRESS NOTES
Patient in office to follow up with rash bilat great toes. No complaints at this time. CC:    Follow-up total nail avulsion great toenail bilateral  Follow-up contact dermatitis    HPI:   Follow-up total nail avulsion great toenail bilateral  Follow-up contact dermatitis    Pain-free today. Wearing regular shoes. Denies any itching or redness. No additional pedal complaints. Very pleased with progression. ROS:  Const: Denies constitutional symptoms  Musculo: Denies symptoms other than stated above  Skin: Denies symptoms other than stated above       Current Outpatient Medications:     mometasone (ELOCON) 0.1 % cream, Apply topically daily. , Disp: 50 g, Rfl: 1    terbinafine (LAMISIL) 1 % cream, Apply affected area topically 2 times daily. , Disp: 42 g, Rfl: 1    metoprolol succinate (TOPROL XL) 25 MG extended release tablet, TAKE 1 TABLET BY MOUTH EVERY DAY, Disp: , Rfl:     meloxicam (MOBIC) 15 MG tablet, Take 1 tablet by mouth daily for 30 doses, Disp: 30 tablet, Rfl: 1    ciclopirox (PENLAC) 8 % solution, Apply once daily all toenails. , Disp: 6 mL, Rfl: 2    pantoprazole (PROTONIX) 40 MG tablet, 1 tablet every other day, Disp: , Rfl:     atorvastatin (LIPITOR) 10 MG tablet, TAKE 1 TABLET BY MOUTH EVERY DAILY, Disp: 30 tablet, Rfl: 0    aspirin 81 MG EC tablet, Take 81 mg by mouth daily. , Disp: , Rfl:   Allergies   Allergen Reactions    Adhesive Tape        Past Medical History:   Diagnosis Date    Carotid artery stenosis            Vitals:    01/17/23 0752   Weight: 170 lb (77.1 kg)   Height: 5' 8\" (1.727 m)           Work History/Social History:    Foot and ankle history: Bilateral total nail avulsion    Focused Lower Extremity Physical Exam:    Neurovascular examination:    Dorsalis Pedis palpable bilateral.  Posterior tibialis palpable bilateral.    Capillary Refill Time:  Immediate return  Hair growth:  Symmetrical and bilateral   Skin:  Not atrophic  Edema: No edema bilateral feet or ankles. Neurologic:  Light touch intact bilateral.      Musculoskeletal/ Orthopedic examination:    Equinis: Absent bilateral  Dorsiflexion, plantarflexion, inversion, eversion bilateral 5 out of 5 muscle strength  Wiggling toes  Negative Homans  Pain-free bilateral feet    Dermatology examination:    No moccasin distribution plantar great toes or dorsal foot bilateral.  No open wounds. No erythema. Skin well coapted bilateral great toenail total nail avulsion site    Assessment and Plan:  Angela Granado was seen today for follow-up. Diagnoses and all orders for this visit:    Tinea pedis of both feet    Ingrown nail    Allergic dermatitis      Follow-up allergic dermatitis and ingrown bilateral great toenail. No open wounds progressing very well. Pain-free. He will call for follow-up as needed going forward. Return if symptoms worsen or fail to improve. Seen By:  Modesto Marroquin DPM      Document was created using voice recognition software. Note was reviewed, however may contain grammatical errors.

## 2023-03-28 ENCOUNTER — HOSPITAL ENCOUNTER (OUTPATIENT)
Age: 63
Discharge: HOME | End: 2023-03-30
Payer: COMMERCIAL

## 2023-03-28 ENCOUNTER — HOSPITAL ENCOUNTER (OUTPATIENT)
Dept: GENERAL RADIOLOGY | Age: 63
Discharge: HOME | End: 2023-03-30
Payer: COMMERCIAL

## 2023-03-28 DIAGNOSIS — R05.9 COMPLAINING OF COUGH: ICD-10-CM

## 2023-03-28 PROCEDURE — 71046 X-RAY EXAM CHEST 2 VIEWS: CPT

## 2023-05-08 ENCOUNTER — OFFICE VISIT (OUTPATIENT)
Dept: VASCULAR SURGERY | Age: 63
End: 2023-05-08
Payer: COMMERCIAL

## 2023-05-08 ENCOUNTER — HOSPITAL ENCOUNTER (OUTPATIENT)
Dept: CARDIOLOGY | Age: 63
Discharge: HOME | End: 2023-05-08
Payer: COMMERCIAL

## 2023-05-08 VITALS — BODY MASS INDEX: 26.52 KG/M2 | WEIGHT: 175 LBS | HEIGHT: 68 IN

## 2023-05-08 DIAGNOSIS — I65.23 ASYMPTOMATIC BILATERAL CAROTID ARTERY STENOSIS: Primary | Chronic | ICD-10-CM

## 2023-05-08 PROCEDURE — 3017F COLORECTAL CA SCREEN DOC REV: CPT | Performed by: PHYSICIAN ASSISTANT

## 2023-05-08 PROCEDURE — 1036F TOBACCO NON-USER: CPT | Performed by: PHYSICIAN ASSISTANT

## 2023-05-08 PROCEDURE — 93880 EXTRACRANIAL BILAT STUDY: CPT

## 2023-05-08 PROCEDURE — G8427 DOCREV CUR MEDS BY ELIG CLIN: HCPCS | Performed by: PHYSICIAN ASSISTANT

## 2023-05-08 PROCEDURE — G8419 CALC BMI OUT NRM PARAM NOF/U: HCPCS | Performed by: PHYSICIAN ASSISTANT

## 2023-05-08 PROCEDURE — 99213 OFFICE O/P EST LOW 20 MIN: CPT | Performed by: PHYSICIAN ASSISTANT

## 2023-05-08 NOTE — PROGRESS NOTES
Vascular Surgery Outpatient Followup    PCP : Melisa Rodriguez MD    HISTORY OF PRESENT ILLNESS:    The patient is a 63 y.o. male who is here in regards to follow up of their asymptomatic bilateral carotid stenosis. Patient denies hx of stroke, TIA, focal weakness, slurred speech or amaurosis fugax. Since last seen he has been placed on metoprolol and his heart rate has been well controlled. He was diagnosed with mild COPD since last seen. He quit smoking in 12/2022. He remains on asa therapy. Past Medical History:        Diagnosis Date    Carotid artery stenosis      Past Surgical History:        Procedure Laterality Date    CATARACT REMOVAL      HERNIA REPAIR       Current Medications:   Current Outpatient Medications   Medication Sig Dispense Refill    mometasone (ELOCON) 0.1 % cream Apply topically daily. 50 g 1    terbinafine (LAMISIL) 1 % cream Apply affected area topically 2 times daily. 42 g 1    metoprolol succinate (TOPROL XL) 25 MG extended release tablet TAKE 1 TABLET BY MOUTH EVERY DAY      ciclopirox (PENLAC) 8 % solution Apply once daily all toenails. 6 mL 2    pantoprazole (PROTONIX) 40 MG tablet 1 tablet every other day      atorvastatin (LIPITOR) 10 MG tablet TAKE 1 TABLET BY MOUTH EVERY DAILY 30 tablet 0    aspirin 81 MG EC tablet Take 1 tablet by mouth daily      meloxicam (MOBIC) 15 MG tablet Take 1 tablet by mouth daily for 30 doses 30 tablet 1     No current facility-administered medications for this visit.      Allergies:  Adhesive tape  Social History     Socioeconomic History    Marital status:      Spouse name: Not on file    Number of children: Not on file    Years of education: Not on file    Highest education level: Not on file   Occupational History    Not on file   Tobacco Use    Smoking status: Former     Types: Cigarettes    Smokeless tobacco: Never    Tobacco comments:     1 pack lasts 4-6 wks   Vaping Use    Vaping Use: Never used   Substance and Sexual

## 2023-05-08 NOTE — PROGRESS NOTES
[facility] - [facility]    This is a pre read worksheet - prior to official physician interpretation    Matthias Saucedo  1960  Date of study: 5/8/23    Indication for study:  Carotid artery stenosis  Study : Bilateral Carotid Artery Duplex Examination    Duplex examination of the RIGHT carotid artery system identifies atherosclerotic plaque. The peak systolic velocity in internal carotid artery was 120 centimeters / second. The maximum end diastolic velocity was 36 centimeters / second. The ICA/CCA ratio is 1.0. The right vertebral artery has antegrade flow. Duplex examination of the LEFT carotid artery system identifies atherosclerotic plaque. The peak systolic velocity in internal carotid artery was 106 centimeters / second. The maximum end diastolic velocity was 39 centimeters / second. The ICA/CCA ratio is 0.8. The left vertebral artery has antegrade flow.         LAST STUDY  3/28/2022  Rt 1-49  Lt 1-49

## 2023-11-28 ENCOUNTER — OFFICE VISIT (OUTPATIENT)
Dept: FAMILY MEDICINE CLINIC | Age: 63
End: 2023-11-28
Payer: COMMERCIAL

## 2023-11-28 VITALS
OXYGEN SATURATION: 99 % | HEART RATE: 65 BPM | RESPIRATION RATE: 16 BRPM | WEIGHT: 188 LBS | TEMPERATURE: 97.3 F | HEIGHT: 68 IN | DIASTOLIC BLOOD PRESSURE: 80 MMHG | SYSTOLIC BLOOD PRESSURE: 118 MMHG | BODY MASS INDEX: 28.49 KG/M2

## 2023-11-28 DIAGNOSIS — J06.9 VIRAL URI: Primary | ICD-10-CM

## 2023-11-28 DIAGNOSIS — J02.9 SORE THROAT: ICD-10-CM

## 2023-11-28 LAB
Lab: NORMAL
PERFORMING INSTRUMENT: NORMAL
QC PASS/FAIL: NORMAL
S PYO AG THROAT QL: NORMAL
SARS-COV-2, POC: NORMAL

## 2023-11-28 PROCEDURE — 1036F TOBACCO NON-USER: CPT | Performed by: FAMILY MEDICINE

## 2023-11-28 PROCEDURE — 87426 SARSCOV CORONAVIRUS AG IA: CPT | Performed by: FAMILY MEDICINE

## 2023-11-28 PROCEDURE — 87880 STREP A ASSAY W/OPTIC: CPT | Performed by: FAMILY MEDICINE

## 2023-11-28 PROCEDURE — 3017F COLORECTAL CA SCREEN DOC REV: CPT | Performed by: FAMILY MEDICINE

## 2023-11-28 PROCEDURE — G8484 FLU IMMUNIZE NO ADMIN: HCPCS | Performed by: FAMILY MEDICINE

## 2023-11-28 PROCEDURE — G8419 CALC BMI OUT NRM PARAM NOF/U: HCPCS | Performed by: FAMILY MEDICINE

## 2023-11-28 PROCEDURE — G8427 DOCREV CUR MEDS BY ELIG CLIN: HCPCS | Performed by: FAMILY MEDICINE

## 2023-11-28 PROCEDURE — 99213 OFFICE O/P EST LOW 20 MIN: CPT | Performed by: FAMILY MEDICINE

## 2023-11-28 RX ORDER — AZITHROMYCIN 250 MG/1
250 TABLET, FILM COATED ORAL SEE ADMIN INSTRUCTIONS
Qty: 6 TABLET | Refills: 0 | Status: SHIPPED | OUTPATIENT
Start: 2023-11-28 | End: 2023-12-03

## 2023-11-28 ASSESSMENT — ENCOUNTER SYMPTOMS
DIARRHEA: 0
BLOOD IN STOOL: 0
TROUBLE SWALLOWING: 0
BACK PAIN: 0
NAUSEA: 0
COUGH: 0
ALLERGIC/IMMUNOLOGIC NEGATIVE: 1
EYE DISCHARGE: 0
EYE PAIN: 0
ABDOMINAL PAIN: 0
PHOTOPHOBIA: 0
SHORTNESS OF BREATH: 0
CHEST TIGHTNESS: 0
VOMITING: 0
SORE THROAT: 1
SINUS PAIN: 0
EYE REDNESS: 0

## 2023-11-28 NOTE — PROGRESS NOTES
Allergic/Immunologic: Negative. Neurological:  Negative for dizziness, seizures, syncope, weakness, light-headedness, numbness and headaches. Hematological:  Negative for adenopathy. Does not bruise/bleed easily. Psychiatric/Behavioral: Negative. Current Outpatient Medications:     azithromycin (ZITHROMAX) 250 MG tablet, Take 1 tablet by mouth See Admin Instructions for 5 days 500mg on day 1 followed by 250mg on days 2 - 5, Disp: 6 tablet, Rfl: 0    metoprolol succinate (TOPROL XL) 25 MG extended release tablet, TAKE 1 TABLET BY MOUTH EVERY DAY, Disp: , Rfl:     pantoprazole (PROTONIX) 40 MG tablet, 1 tablet every other day, Disp: , Rfl:     atorvastatin (LIPITOR) 10 MG tablet, TAKE 1 TABLET BY MOUTH EVERY DAILY, Disp: 30 tablet, Rfl: 0    aspirin 81 MG EC tablet, Take 1 tablet by mouth daily, Disp: , Rfl:     mometasone (ELOCON) 0.1 % cream, Apply topically daily. (Patient not taking: Reported on 11/28/2023), Disp: 50 g, Rfl: 1    terbinafine (LAMISIL) 1 % cream, Apply affected area topically 2 times daily. (Patient not taking: Reported on 11/28/2023), Disp: 42 g, Rfl: 1    meloxicam (MOBIC) 15 MG tablet, Take 1 tablet by mouth daily for 30 doses (Patient not taking: Reported on 11/28/2023), Disp: 30 tablet, Rfl: 1    ciclopirox (PENLAC) 8 % solution, Apply once daily all toenails. (Patient not taking: Reported on 11/28/2023), Disp: 6 mL, Rfl: 2  Allergies   Allergen Reactions    Adhesive Tape        Past Medical History:   Diagnosis Date    Carotid artery stenosis      Past Surgical History:   Procedure Laterality Date    CATARACT REMOVAL      HERNIA REPAIR       No family history on file.   Social History     Socioeconomic History    Marital status:      Spouse name: Not on file    Number of children: Not on file    Years of education: Not on file    Highest education level: Not on file   Occupational History    Not on file   Tobacco Use    Smoking status: Former     Types: Cigarettes

## 2024-02-16 ENCOUNTER — OFFICE VISIT (OUTPATIENT)
Dept: PODIATRY | Age: 64
End: 2024-02-16
Payer: COMMERCIAL

## 2024-02-16 DIAGNOSIS — L84 CORNS AND CALLOSITIES: ICD-10-CM

## 2024-02-16 DIAGNOSIS — L85.3 XEROSIS CUTIS: ICD-10-CM

## 2024-02-16 DIAGNOSIS — M20.42 HAMMER TOES OF BOTH FEET: ICD-10-CM

## 2024-02-16 DIAGNOSIS — M20.41 HAMMER TOES OF BOTH FEET: ICD-10-CM

## 2024-02-16 DIAGNOSIS — M79.671 RIGHT FOOT PAIN: Primary | ICD-10-CM

## 2024-02-16 PROCEDURE — G8419 CALC BMI OUT NRM PARAM NOF/U: HCPCS | Performed by: PODIATRIST

## 2024-02-16 PROCEDURE — 1036F TOBACCO NON-USER: CPT | Performed by: PODIATRIST

## 2024-02-16 PROCEDURE — G8484 FLU IMMUNIZE NO ADMIN: HCPCS | Performed by: PODIATRIST

## 2024-02-16 PROCEDURE — G8427 DOCREV CUR MEDS BY ELIG CLIN: HCPCS | Performed by: PODIATRIST

## 2024-02-16 PROCEDURE — 3017F COLORECTAL CA SCREEN DOC REV: CPT | Performed by: PODIATRIST

## 2024-02-16 PROCEDURE — 99213 OFFICE O/P EST LOW 20 MIN: CPT | Performed by: PODIATRIST

## 2024-02-16 RX ORDER — AMMONIUM LACTATE 12 G/100G
LOTION TOPICAL
Qty: 400 G | Refills: 2 | Status: SHIPPED | OUTPATIENT
Start: 2024-02-16

## 2024-02-16 RX ORDER — MELOXICAM 15 MG/1
15 TABLET ORAL DAILY
Qty: 30 TABLET | Refills: 1 | Status: SHIPPED | OUTPATIENT
Start: 2024-02-16 | End: 2024-04-16

## 2024-02-16 NOTE — PROGRESS NOTES
Patient in office with c/o right foot pain.  Xray obtained prior to apt.       Chet Talbert : 1960 Sex: male  Age: 63 y.o.    Patient was referred by Nathanael Parsons MD    CC:    Right foot pain and callus tissue    HPI:   Chet presents today for right foot pain and callus tissue on the bottom both feet worse on the right.  Symptoms present several months.  No recent injury or trauma.  Radiographs right foot obtained today.  No recent formal treatment or therapy.  Denies any open skin lesions or abrasions.  No additional pedal complaints at this time.    ROS:  Const: Denies constitutional symptoms  Musculo: Denies symptoms other than stated above  Skin: Denies symptoms other than stated above       Current Outpatient Medications:     ammonium lactate (LAC-HYDRIN) 12 % lotion, Apply topically twice daily, Disp: 400 g, Rfl: 2    meloxicam (MOBIC) 15 MG tablet, Take 1 tablet by mouth daily for 60 doses, Disp: 30 tablet, Rfl: 1    metoprolol succinate (TOPROL XL) 25 MG extended release tablet, TAKE 1 TABLET BY MOUTH EVERY DAY, Disp: , Rfl:     pantoprazole (PROTONIX) 40 MG tablet, 1 tablet every other day, Disp: , Rfl:     atorvastatin (LIPITOR) 10 MG tablet, TAKE 1 TABLET BY MOUTH EVERY DAILY, Disp: 30 tablet, Rfl: 0    aspirin 81 MG EC tablet, Take 1 tablet by mouth daily, Disp: , Rfl:   Allergies   Allergen Reactions    Adhesive Tape        Past Medical History:   Diagnosis Date    Carotid artery stenosis            There were no vitals filed for this visit.    Work History/Social History:   Foot and ankle history:     Focused Lower Extremity Physical Exam:    Neurovascular examination:    Dorsalis Pedis palpable bilateral.  Posterior tibialis palpable bilateral.    Capillary Refill Time:  Immediate return  Hair growth:  Symmetrical and bilateral   Skin:  Not atrophic  Edema: No edema bilateral feet or ankles.  Neurologic:  Light touch intact bilateral.      Musculoskeletal/ Orthopedic examination:

## 2024-04-26 DIAGNOSIS — I65.23 ASYMPTOMATIC BILATERAL CAROTID ARTERY STENOSIS: Primary | ICD-10-CM

## 2024-07-01 ENCOUNTER — HOSPITAL ENCOUNTER (OUTPATIENT)
Dept: CARDIOLOGY | Age: 64
Discharge: HOME | End: 2024-07-03
Attending: SURGERY
Payer: COMMERCIAL

## 2024-07-01 ENCOUNTER — OFFICE VISIT (OUTPATIENT)
Dept: VASCULAR SURGERY | Age: 64
End: 2024-07-01
Payer: COMMERCIAL

## 2024-07-01 VITALS — WEIGHT: 180 LBS | BODY MASS INDEX: 27.37 KG/M2

## 2024-07-01 DIAGNOSIS — I65.23 ASYMPTOMATIC BILATERAL CAROTID ARTERY STENOSIS: ICD-10-CM

## 2024-07-01 DIAGNOSIS — I65.23 ASYMPTOMATIC BILATERAL CAROTID ARTERY STENOSIS: Primary | ICD-10-CM

## 2024-07-01 LAB
VAS LEFT CCA DIST EDV: 23.2 CM/S
VAS LEFT CCA DIST PSV: 95.4 CM/S
VAS LEFT CCA PROX EDV: 29 CM/S
VAS LEFT CCA PROX PSV: 120.3 CM/S
VAS LEFT ECA EDV: 19.9 CM/S
VAS LEFT ECA PSV: 98.4 CM/S
VAS LEFT ICA DIST EDV: 39.8 CM/S
VAS LEFT ICA DIST PSV: 95.5 CM/S
VAS LEFT ICA MID EDV: 36.2 CM/S
VAS LEFT ICA MID PSV: 101.7 CM/S
VAS LEFT ICA PROX EDV: 22.9 CM/S
VAS LEFT ICA PROX PSV: 82.3 CM/S
VAS LEFT ICA/CCA PSV: 0.8 NO UNITS
VAS LEFT VERTEBRAL EDV: 20.3 CM/S
VAS LEFT VERTEBRAL PSV: 91.1 CM/S
VAS RIGHT CCA DIST EDV: 29 CM/S
VAS RIGHT CCA DIST PSV: 107.5 CM/S
VAS RIGHT CCA PROX EDV: 27.2 CM/S
VAS RIGHT CCA PROX PSV: 122.1 CM/S
VAS RIGHT ECA EDV: 23.5 CM/S
VAS RIGHT ECA PSV: 131.2 CM/S
VAS RIGHT ICA DIST EDV: 30.7 CM/S
VAS RIGHT ICA DIST PSV: 86.9 CM/S
VAS RIGHT ICA MID EDV: 32.7 CM/S
VAS RIGHT ICA MID PSV: 102 CM/S
VAS RIGHT ICA PROX EDV: 23.2 CM/S
VAS RIGHT ICA PROX PSV: 75.8 CM/S
VAS RIGHT ICA/CCA PSV: 0.8 NO UNITS
VAS RIGHT VERTEBRAL EDV: 9.3 CM/S
VAS RIGHT VERTEBRAL PSV: 48.9 CM/S

## 2024-07-01 PROCEDURE — G8419 CALC BMI OUT NRM PARAM NOF/U: HCPCS | Performed by: SURGERY

## 2024-07-01 PROCEDURE — 3017F COLORECTAL CA SCREEN DOC REV: CPT | Performed by: SURGERY

## 2024-07-01 PROCEDURE — 1036F TOBACCO NON-USER: CPT | Performed by: SURGERY

## 2024-07-01 PROCEDURE — G8427 DOCREV CUR MEDS BY ELIG CLIN: HCPCS | Performed by: SURGERY

## 2024-07-01 PROCEDURE — 93880 EXTRACRANIAL BILAT STUDY: CPT | Performed by: SURGERY

## 2024-07-01 PROCEDURE — 93880 EXTRACRANIAL BILAT STUDY: CPT

## 2024-07-01 PROCEDURE — 99212 OFFICE O/P EST SF 10 MIN: CPT | Performed by: SURGERY

## 2024-07-01 NOTE — PROGRESS NOTES
Vascular Surgery Outpatient Followup    PCP : Nathanael Parsons MD    HISTORY OF PRESENT ILLNESS:    The patient is a 63 y.o. male who is here in regards to follow up of their asymptomatic bilateral carotid stenosis.  Patient denies hx of stroke, TIA, focal weakness, slurred speech or amaurosis fugax.      He has been feeling better in re to breathing and does not need his inhaler anymore.      He quit smoking in 12/2022. He remains on asa therapy.    He now lives in [place]    He denies significant changes in overall health.    Past Medical History:        Diagnosis Date    Carotid artery stenosis      Past Surgical History:        Procedure Laterality Date    CATARACT REMOVAL      HERNIA REPAIR       Current Medications:   Current Outpatient Medications   Medication Sig Dispense Refill    ammonium lactate (LAC-HYDRIN) 12 % lotion Apply topically twice daily 400 g 2    metoprolol succinate (TOPROL XL) 25 MG extended release tablet TAKE 1 TABLET BY MOUTH EVERY DAY      pantoprazole (PROTONIX) 40 MG tablet 1 tablet every other day      atorvastatin (LIPITOR) 10 MG tablet TAKE 1 TABLET BY MOUTH EVERY DAILY 30 tablet 0    aspirin 81 MG EC tablet Take 1 tablet by mouth daily      meloxicam (MOBIC) 15 MG tablet Take 1 tablet by mouth daily for 60 doses 30 tablet 1     No current facility-administered medications for this visit.     Allergies:  Adhesive tape  Social History     Socioeconomic History    Marital status:      Spouse name: Not on file    Number of children: Not on file    Years of education: Not on file    Highest education level: Not on file   Occupational History    Not on file   Tobacco Use    Smoking status: Former     Types: Cigarettes    Smokeless tobacco: Never    Tobacco comments:     1 pack lasts 4-6 wks   Vaping Use    Vaping Use: Never used   Substance and Sexual Activity    Alcohol use: No    Drug use: Never    Sexual activity: Not on file   Other Topics Concern    Not on file   Social

## 2024-07-10 ENCOUNTER — HOSPITAL ENCOUNTER (OUTPATIENT)
Dept: CT IMAGING | Age: 64
Discharge: HOME | End: 2024-07-12
Payer: COMMERCIAL

## 2024-07-10 DIAGNOSIS — J44.9 CHRONIC OBSTRUCTIVE PULMONARY DISEASE, UNSPECIFIED COPD TYPE (HCC): ICD-10-CM

## 2024-07-10 DIAGNOSIS — F17.211 CIGARETTE NICOTINE DEPENDENCE IN REMISSION: ICD-10-CM

## 2024-07-10 DIAGNOSIS — F17.210 CIGARETTE SMOKER: ICD-10-CM

## 2024-07-10 PROCEDURE — 71271 CT THORAX LUNG CANCER SCR C-: CPT

## 2025-08-19 ENCOUNTER — HOSPITAL ENCOUNTER (OUTPATIENT)
Dept: PULMONOLOGY | Age: 65
Discharge: HOME | End: 2025-08-19
Attending: INTERNAL MEDICINE
Payer: COMMERCIAL

## 2025-08-19 ENCOUNTER — HOSPITAL ENCOUNTER (OUTPATIENT)
Dept: CT IMAGING | Age: 65
Discharge: HOME | End: 2025-08-21
Attending: INTERNAL MEDICINE
Payer: COMMERCIAL

## 2025-08-19 DIAGNOSIS — J44.9 CHRONIC OBSTRUCTIVE PULMONARY DISEASE, UNSPECIFIED COPD TYPE (HCC): ICD-10-CM

## 2025-08-19 PROCEDURE — 71271 CT THORAX LUNG CANCER SCR C-: CPT

## 2025-08-19 PROCEDURE — 94726 PLETHYSMOGRAPHY LUNG VOLUMES: CPT

## 2025-08-19 PROCEDURE — 94060 EVALUATION OF WHEEZING: CPT

## 2025-08-19 PROCEDURE — 94729 DIFFUSING CAPACITY: CPT

## 2025-09-04 ENCOUNTER — HOSPITAL ENCOUNTER (OUTPATIENT)
Age: 65
Discharge: HOME | End: 2025-09-06